# Patient Record
Sex: MALE | Race: BLACK OR AFRICAN AMERICAN | HISPANIC OR LATINO | Employment: FULL TIME | URBAN - METROPOLITAN AREA
[De-identification: names, ages, dates, MRNs, and addresses within clinical notes are randomized per-mention and may not be internally consistent; named-entity substitution may affect disease eponyms.]

---

## 2018-06-20 ENCOUNTER — HOSPITAL ENCOUNTER (EMERGENCY)
Facility: HOSPITAL | Age: 28
Discharge: HOME | End: 2018-06-20
Admitting: EMERGENCY MEDICINE

## 2018-06-20 ENCOUNTER — APPOINTMENT (EMERGENCY)
Dept: RADIOLOGY | Facility: HOSPITAL | Age: 28
End: 2018-06-20

## 2018-06-20 VITALS
OXYGEN SATURATION: 99 % | WEIGHT: 207 LBS | DIASTOLIC BLOOD PRESSURE: 91 MMHG | TEMPERATURE: 97.9 F | RESPIRATION RATE: 16 BRPM | HEART RATE: 68 BPM | SYSTOLIC BLOOD PRESSURE: 132 MMHG

## 2018-06-20 DIAGNOSIS — L03.011 PARONYCHIA OF RIGHT MIDDLE FINGER: Primary | ICD-10-CM

## 2018-06-20 PROCEDURE — 73140 X-RAY EXAM OF FINGER(S): CPT

## 2018-06-20 PROCEDURE — 99283 EMERGENCY DEPT VISIT LOW MDM: CPT

## 2018-06-20 RX ORDER — LIDOCAINE HYDROCHLORIDE 10 MG/ML
5 INJECTION, SOLUTION EPIDURAL; INFILTRATION; INTRACAUDAL; PERINEURAL ONCE
Status: COMPLETED | OUTPATIENT
Start: 2018-06-20 | End: 2018-06-20

## 2018-06-20 RX ORDER — CEPHALEXIN 500 MG/1
500 CAPSULE ORAL 4 TIMES DAILY
Qty: 20 CAPSULE | Refills: 0 | Status: SHIPPED | OUTPATIENT
Start: 2018-06-20 | End: 2018-06-25

## 2018-06-20 RX ADMIN — LIDOCAINE HYDROCHLORIDE 5 ML: 10 INJECTION, SOLUTION EPIDURAL; INFILTRATION; INTRACAUDAL; PERINEURAL at 18:32

## 2018-06-20 NOTE — ED PROVIDER NOTES
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   He denies any fever, red streaking  Denies any discharge or pus however he states that he sees a yellow pocket of pus that may have dried out  He took an Advil without relief of pain  Does not recall if he injured his finger  None       Past Medical History:   Diagnosis Date    Asthma        History reviewed  No pertinent surgical history  History reviewed  No pertinent family history  I have reviewed and agree with the history as documented  Social History   Substance Use Topics    Smoking status: Current Every Day Smoker    Smokeless tobacco: Never Used    Alcohol use Yes        Review of Systems   Constitutional: Negative for chills and fever  Gastrointestinal: Negative for nausea and vomiting  Musculoskeletal: Positive for arthralgias and joint swelling  Skin: Positive for color change  Physical Exam  Physical Exam   Constitutional: He is oriented to person, place, and time  He appears well-developed  No distress  Musculoskeletal: He exhibits tenderness  Right hand: He exhibits tenderness and swelling  He exhibits normal range of motion, no bony tenderness, normal capillary refill, no deformity and no laceration  Normal sensation noted  Normal strength noted  Hands:  Neurological: He is alert and oriented to person, place, and time  Skin: Skin is warm  He is not diaphoretic  There is erythema  Vitals reviewed        Vital Signs  ED Triage Vitals [06/20/18 1706]   Temperature Pulse Respirations Blood Pressure SpO2   97 9 °F (36 6 °C) 68 16 132/91 99 %      Temp Source Heart Rate Source Patient Position - Orthostatic VS BP Location FiO2 (%)   Temporal Monitor Sitting Left arm --      Pain Score       6           Vitals:    06/20/18 1706   BP: 132/91   Pulse: 68   Patient Position - Orthostatic VS: Sitting       Visual Acuity      ED Medications  Medications   lidocaine (PF) (XYLOCAINE-MPF) 1 % injection 5 mL (5 mL Infiltration Given 6/20/18 1832)       Diagnostic Studies  Results Reviewed     None                 XR finger third digit-middle RIGHT   ED Interpretation by Jerry Mosher PA-C (06/20 1913)   No fractures noted                  Procedures  Incision/Drainage  Date/Time: 6/20/2018 8:19 PM  Performed by: Sav Britton  Authorized by: Sav Britton     Patient location:  ED  Other Assisting Provider: No    Consent:     Consent obtained:  Verbal    Consent given by:  Patient    Risks discussed:  Bleeding, incomplete drainage, pain and infection  Universal protocol:     Patient identity confirmed:  Verbally with patient  Location:     Indications for incision and drainage: paronychia  Location:  Upper extremity    Upper extremity location:  R long finger  Pre-procedure details:     Procedure prep: alcohol prep  Anesthesia (see MAR for exact dosages):      Anesthesia method:  Local infiltration    Local anesthetic:  Lidocaine 1% w/o epi  Procedure details:     Complexity:  Simple    Incision types:  Stab incision    Scalpel blade:  11    Approach:  Open    Incision depth:  Skin    Drainage:  Bloody    Drainage amount:  Scant    Wound treatment:  Wound left open    Packing materials:  None           Phone Contacts  ED Phone Contact    ED Course                               MDM  Number of Diagnoses or Management Options  Paronychia of right middle finger:   Diagnosis management comments: 51-year-old male presents for evaluation of right hand middle finger swelling and pain for the past 2 days  I&D was done with minimal drainage of pus  Patient then states that he did notice pus discharge come from the finger and minimal discharge noted from incision site  At this time advised warm soaks as well as a course of antibiotics  Return precautions discussed  Patient demonstrates understanding agrees to plan  CritCare Time    Disposition  Final diagnoses:   Paronychia of right middle finger     Time reflects when diagnosis was documented in both MDM as applicable and the Disposition within this note     Time User Action Codes Description Comment    6/20/2018  8:10 PM [address] [place] [address] [A08 [id number]] Paronychia of right middle finger       ED Disposition     ED Disposition Condition Comment    Discharge  Erven Pilar discharge to home/self care  Condition at discharge: Good        Follow-up Information     Follow up With Specialties Details Why Contact Info Additional Information    [facility] Emergency Medicine   [address] [place] [phone] [place] [facility], [address] [facility] , [place], [address] [place] [address], [id number]          Discharge Medication List as of 6/20/2018  8:11 PM      START taking these medications    Details   cephalexin (KEFLEX) 500 mg capsule Take 1 capsule (500 mg total) by mouth 4 (four) times a day for 5 days, Starting Wed 6/20/2018, Until Mon 6/25/2018, Print           No discharge procedures on file      ED Provider  Electronically Signed by           Toy Overton PA-C  06/20/18 2021

## 2018-06-21 NOTE — DISCHARGE INSTRUCTIONS
Paronychia   WHAT YOU NEED TO KNOW:   What is paronychia? Paronychia is an infection of your nail fold caused by bacteria or a fungus  The nail fold is the skin around your nail  Paronychia may happen suddenly and last for 6 weeks or longer  You may have paronychia on more than 1 finger or toe  What increases my risk for paronychia? · Trauma:  Any injury that causes your skin to tear can lead to infection  Your risk is increased if you have ingrown nails, bite your nails, or wear acrylic nails  · Frequent contact with water:  Jobs that require you to soak your hands in water often may increase your risk for paronychia  Common examples are nurses, cooks, and   Swimmers also have increased risk  · Medical conditions:  Diabetes and other conditions that cause a weak immune system can increase your risk  Some examples are skin cancer, psoriasis, HIV, and lupus  · Chemicals:  Contact with soaps, detergents, and other chemicals can cause inflammation and lead to paronychia  · Allergies: Allergies to certain foods, nail polish, or latex can cause inflammation and increase your risk  What are the signs and symptoms of paronychia? · Red, hot, swollen, painful nail fold    · Pus coming out of your nail fold when you press on it    · Nail that pulls away from your nail fold and may fall off    · Changes in nail color, such as green nails    · Fever    · Thick, rough nail, or ridges in the nail  How is paronychia diagnosed? Your healthcare provider will examine your nails and ask about your symptoms  He may press on your infected nail to see if pus drains from it  He will send any pus to a lab for tests to learn what germ is causing your infection  This is called a fluid culture  How is paronychia treated? · Medicine:     ¨ Td vaccine  is a booster shot used to help prevent tetanus and diphtheria   The Td booster may be given to adolescents and adults every 10 years or for certain wounds and injuries  ¨ Antibiotics: This medicine will help fight or prevent an infection caused by bacteria  It may be given as a pill, cream, or ointment  ¨ Steroids: This medicine will help decrease inflammation  It may be given as a pill, cream, or ointment  ¨ Antifungal medicine: This medicine helps kill fungus that may be causing your infection  It may be given as a cream or ointment  ¨ NSAIDs:  These medicines decrease pain and swelling  NSAIDs are available without a doctor's order  Ask your healthcare provider which medicine is right for you  Ask how much to take and when to take it  Take as directed  NSAIDs can cause stomach bleeding and kidney problems if not taken correctly  · Procedures: You may need surgery to drain an abscess (pus pocket) in your finger or toe  Your nail may need to be removed  Infected tissue around your nail may also need to be removed  What are the risks of paronychia? Your nail may become loose, deformed, or fall off  The infection may form a large abscess on your nail  The infection may spread to nearby tissue and bone  How can paronychia be prevented? · Avoid chemicals and allergens that may harm your skin and nails  This includes soaps, laundry detergents, and nail products  · Keep your nails clean and dry  Do not soak your nails in water  Use cotton-lined rubber gloves or wear 2 rubber gloves if you work with food or water  The gloves will help protect your nail folds  · Keep your nails short  Do not bite your nails, pick at your hangnails, suck your fingers, or wear fake nails  Bring your own nail tools when you go to the nail salon  How can I manage my symptoms? · Soak your nail:  Soak your nail in a mixture of equal parts vinegar and water 3 or 4 times each day  This will help decrease inflammation  · Apply a warm compress:  Soak a washcloth in warm water and place it on your nail  This will help decrease inflammation       · Elevate:  Raise your nail above the level of your heart as often as you can  This will help decrease swelling and pain  Prop your nail on pillows or blankets to keep it elevated comfortably  · Use lotion:  Apply lotion after you wash your hands  This will prevent the skin from becoming too dry  When should I contact my healthcare provider? · Your nail becomes loose, deformed, or falls off  · You have a large abscess on your nail  · You have questions or concerns about your condition or care  When should I seek immediate care? · You have severe nail pain  · The inflammation spreads to your hand or arm  CARE AGREEMENT:   You have the right to help plan your care  Learn about your health condition and how it may be treated  Discuss treatment options with your caregivers to decide what care you want to receive  You always have the right to refuse treatment  The above information is an  only  It is not intended as medical advice for individual conditions or treatments  Talk to your doctor, nurse or pharmacist before following any medical regimen to see if it is safe and effective for you  © 2017 [employer] Information is for End User's use only and may not be sold, redistributed or otherwise used for commercial purposes  All illustrations and images included in CareNotes® are the copyrighted property of [employer]  or Jasmeet Canales

## 2020-07-01 ENCOUNTER — APPOINTMENT (EMERGENCY)
Dept: RADIOLOGY | Facility: HOSPITAL | Age: 30
End: 2020-07-01

## 2020-07-01 ENCOUNTER — HOSPITAL ENCOUNTER (EMERGENCY)
Facility: HOSPITAL | Age: 30
Discharge: HOME | End: 2020-07-01
Attending: EMERGENCY MEDICINE

## 2020-07-01 VITALS
OXYGEN SATURATION: 99 % | RESPIRATION RATE: 16 BRPM | SYSTOLIC BLOOD PRESSURE: 152 MMHG | HEART RATE: 63 BPM | WEIGHT: 204.1 LBS | DIASTOLIC BLOOD PRESSURE: 85 MMHG | TEMPERATURE: 97.6 F

## 2020-07-01 DIAGNOSIS — S93.409A SPRAIN OF ANKLE: Primary | ICD-10-CM

## 2020-07-01 PROCEDURE — 73610 X-RAY EXAM OF ANKLE: CPT

## 2020-07-01 PROCEDURE — 73630 X-RAY EXAM OF FOOT: CPT

## 2020-07-01 PROCEDURE — 99283 EMERGENCY DEPT VISIT LOW MDM: CPT

## 2020-07-01 PROCEDURE — 99282 EMERGENCY DEPT VISIT SF MDM: CPT | Performed by: PHYSICIAN ASSISTANT

## 2020-07-01 NOTE — ED NOTES
Pt does not wish to use crutches, provider notified   D/c instruction given by provider     Saintclair Isles, RN  07/01/20 3487 Dilan Vargas RN  07/01/20 6077

## 2020-07-01 NOTE — ED PROVIDER NOTES
History  Chief Complaint   Patient presents with    Ankle Injury     pt c/o right ankle pain d/t kicking his car 2 days ago  Pt states he feels a shooting pain when he puts pressure on affected side  no meds taken for symptoms     26-year-old male presenting for evaluation of right ankle pain  He states that 2 days ago he got a new car was driving to work when the transmission broke  He reports he was so frustrated he got out of the car and kicked the tire multiple times  He reports when he got home that day he started with pain mostly localized over the lateral malleolus of the right lower extremity  He applied ice to the extremity at home with only minimal relief  Did not take any thing by mouth for symptoms  Denies any numbness tingling or weakness  Denies any previous injury  None       Past Medical History:   Diagnosis Date    Asthma        History reviewed  No pertinent surgical history  History reviewed  No pertinent family history  I have reviewed and agree with the history as documented  E-Cigarette/Vaping     E-Cigarette/Vaping Substances     Social History     Tobacco Use    Smoking status: Current Every Day Smoker     Packs/day: 0 25     Types: Cigarettes    Smokeless tobacco: Never Used   Substance Use Topics    Alcohol use: Yes     Comment: socially    Drug use: Yes     Types: Marijuana     Comment: daily       Review of Systems   All other systems reviewed and are negative  Physical Exam  Physical Exam   Constitutional: He is oriented to person, place, and time  He appears well-developed and well-nourished  No distress  HENT:   Head: Normocephalic and atraumatic  Eyes: Conjunctivae are normal    EOM grossly intact   Neck: Normal range of motion  Neck supple  No JVD present  Cardiovascular: Normal rate  Pulmonary/Chest: Effort normal    Abdominal: Soft     Musculoskeletal:        Feet:    FROM, steady gait, cap refill brisk, strength and sensation grossly intact throughout   Neurological: He is alert and oriented to person, place, and time  Skin: Skin is warm and dry  Capillary refill takes less than 2 seconds  Psychiatric: He has a normal mood and affect  His behavior is normal    Nursing note and vitals reviewed  Vital Signs  ED Triage Vitals [07/01/20 1211]   Temperature Pulse Respirations Blood Pressure SpO2   97 6 °F (36 4 °C) 63 16 152/85 99 %      Temp Source Heart Rate Source Patient Position - Orthostatic VS BP Location FiO2 (%)   Tympanic Monitor Sitting Left arm --      Pain Score       --           Vitals:    07/01/20 1211   BP: 152/85   Pulse: 63   Patient Position - Orthostatic VS: Sitting         Visual Acuity      ED Medications  Medications - No data to display    Diagnostic Studies  Results Reviewed     None                 XR ankle 3+ views RIGHT    (Results Pending)   XR foot 3+ views RIGHT    (Results Pending)              Procedures  Procedures         ED Course       US AUDIT      Most Recent Value   Initial Alcohol Screen: US AUDIT-C    1  How often do you have a drink containing alcohol?  0 Filed at: 07/01/2020 1212   2  How many drinks containing alcohol do you have on a typical day you are drinking? 0 Filed at: 07/01/2020 1212   3a  Male UNDER 65: How often do you have five or more drinks on one occasion? 0 Filed at: 07/01/2020 1212   3b  FEMALE Any Age, or MALE 65+: How often do you have 4 or more drinks on one occassion? 0 Filed at: 07/01/2020 1212   Audit-C Score  0 Filed at: 07/01/2020 1212                  RIGO/DAST-10      Most Recent Value   How many times in the past year have you    Used an illegal drug or used a prescription medication for non-medical reasons? Daily or Almost Daily Filed at: 07/01/2020 1212   In the past 12 months      1  Have you used drugs other than those required for medical reasons? 1 Filed at: 07/01/2020 1212   2  Do you use more than one drug at a time? 0 Filed at: 07/01/2020 1212   3   Have you had medical problems as a result of your drug use (e g , memory loss, hepatitis, convulsions, bleeding, etc )? 0 Filed at: 07/01/2020 1212   4  Have you had "blackouts" or "flashbacks" as a result of drug use? YesNo  0 Filed at: 07/01/2020 1212   5  Do you ever feel bad or guilty about your drug use? 0 Filed at: 07/01/2020 1212   6  Does your spouse (or parent) ever complain about your involvement with drugs? 0 Filed at: 07/01/2020 1212   7  Have you neglected your family because of your use of drugs? 0 Filed at: 07/01/2020 1212   8  Have you engaged in illegal activities in order to obtain drugs? 0 Filed at: 07/01/2020 1212   9  Have you ever experienced withdrawal symptoms (felt sick) when you stopped taking drugs? 0 Filed at: 07/01/2020 1212   10  Are you always able to stop using drugs when you want to?  0 Filed at: 07/01/2020 1212   DAST-10 Score  1 Filed at: 07/01/2020 1212                                MDM  Number of Diagnoses or Management Options  Sprain of ankle:   Diagnosis management comments: 22-year-old male presenting for evaluation of right ankle pain for 2 days after kicking his car, no acute osseous abnormality visualized on x-ray, likely sprain versus contusion, place patient Ace bandage, he is refusing crutches at this time, advised him to restrict activity ice elevate use Motrin Tylenol at home as needed for pain and inflammation, he is distally neurovascularly intact, f/u with pcp and ortho as an outpatient    All imaging discussed with patient, strict return to ED precautions discussed  Pt verbalizes understanding and agrees with plan  Pt is stable for discharge    Portions of the record may have been created with voice recognition software  Occasional wrong word or "sound a like" substitutions may have occurred due to the inherent limitations of voice recognition software   Read the chart carefully and recognize, using context, where substitutions have occurred  Disposition  Final diagnoses:   Sprain of ankle     Time reflects when diagnosis was documented in both MDM as applicable and the Disposition within this note     Time User Action Codes Description Comment    7/1/2020  1:16 PM Cierra Tyler Add [D09 [id number]] Sprain of ankle       ED Disposition     ED Disposition Condition Date/Time Comment    Discharge Stable Wed Jul 1, 2020  1:16 PM Tamara Duque discharge to home/self care  Follow-up Information     Follow up With Specialties Details Why Contact Info Additional [address] Family Medicine Call in 1 day  [address], [address] [id number]  [address], [address], [address] [place], [place], [address]    Amparo Moreno MD Orthopedic Surgery, Hand Surgery Call in 1 day  [address]  [phone]             There are no discharge medications for this patient  No discharge procedures on file      PDMP Review     None          ED Provider  Electronically Signed by           Paola Hall PA-C  07/01/20 9752